# Patient Record
Sex: FEMALE | Race: ASIAN | NOT HISPANIC OR LATINO
[De-identification: names, ages, dates, MRNs, and addresses within clinical notes are randomized per-mention and may not be internally consistent; named-entity substitution may affect disease eponyms.]

---

## 2017-04-03 PROBLEM — Z00.00 ENCOUNTER FOR PREVENTIVE HEALTH EXAMINATION: Status: ACTIVE | Noted: 2017-04-03

## 2020-08-17 ENCOUNTER — RESULT REVIEW (OUTPATIENT)
Age: 40
End: 2020-08-17

## 2021-11-29 ENCOUNTER — NON-APPOINTMENT (OUTPATIENT)
Age: 41
End: 2021-11-29

## 2021-12-16 ENCOUNTER — NON-APPOINTMENT (OUTPATIENT)
Age: 41
End: 2021-12-16

## 2021-12-16 ENCOUNTER — APPOINTMENT (OUTPATIENT)
Dept: GASTROENTEROLOGY | Facility: CLINIC | Age: 41
End: 2021-12-16
Payer: COMMERCIAL

## 2021-12-16 VITALS
HEIGHT: 69 IN | OXYGEN SATURATION: 97 % | DIASTOLIC BLOOD PRESSURE: 100 MMHG | RESPIRATION RATE: 14 BRPM | TEMPERATURE: 96.3 F | WEIGHT: 160 LBS | BODY MASS INDEX: 23.7 KG/M2 | SYSTOLIC BLOOD PRESSURE: 145 MMHG | HEART RATE: 62 BPM

## 2021-12-16 VITALS
SYSTOLIC BLOOD PRESSURE: 109 MMHG | TEMPERATURE: 97.3 F | HEART RATE: 66 BPM | RESPIRATION RATE: 15 BRPM | HEIGHT: 61 IN | OXYGEN SATURATION: 98 % | WEIGHT: 113 LBS | BODY MASS INDEX: 21.34 KG/M2 | DIASTOLIC BLOOD PRESSURE: 62 MMHG

## 2021-12-16 DIAGNOSIS — R79.82 ELEVATED C-REACTIVE PROTEIN (CRP): ICD-10-CM

## 2021-12-16 PROCEDURE — 99205 OFFICE O/P NEW HI 60 MIN: CPT

## 2021-12-16 PROCEDURE — 99072 ADDL SUPL MATRL&STAF TM PHE: CPT

## 2021-12-16 NOTE — PHYSICAL EXAM
[General Appearance - Alert] : alert [General Appearance - In No Acute Distress] : in no acute distress [Sclera] : the sclera and conjunctiva were normal [PERRL With Normal Accommodation] : pupils were equal in size, round, and reactive to light [Extraocular Movements] : extraocular movements were intact [Outer Ear] : the ears and nose were normal in appearance [Oropharynx] : the oropharynx was normal [Neck Appearance] : the appearance of the neck was normal [Neck Cervical Mass (___cm)] : no neck mass was observed [Jugular Venous Distention Increased] : there was no jugular-venous distention [Thyroid Diffuse Enlargement] : the thyroid was not enlarged [Thyroid Nodule] : there were no palpable thyroid nodules [Auscultation Breath Sounds / Voice Sounds] : lungs were clear to auscultation bilaterally [Full Pulse] : the pedal pulses are present [Edema] : there was no peripheral edema [Bowel Sounds] : normal bowel sounds [Abdomen Soft] : soft [Abdomen Tenderness] : non-tender [Abdomen Mass (___ Cm)] : no abdominal mass palpated [Cervical Lymph Nodes Enlarged Posterior Bilaterally] : posterior cervical [Cervical Lymph Nodes Enlarged Anterior Bilaterally] : anterior cervical [Supraclavicular Lymph Nodes Enlarged Bilaterally] : supraclavicular [Axillary Lymph Nodes Enlarged Bilaterally] : axillary [Femoral Lymph Nodes Enlarged Bilaterally] : femoral [Inguinal Lymph Nodes Enlarged Bilaterally] : inguinal [No CVA Tenderness] : no ~M costovertebral angle tenderness [No Spinal Tenderness] : no spinal tenderness [Skin Color & Pigmentation] : normal skin color and pigmentation [Skin Turgor] : normal skin turgor [] : no rash [Deep Tendon Reflexes (DTR)] : deep tendon reflexes were 2+ and symmetric [Sensation] : the sensory exam was normal to light touch and pinprick [No Focal Deficits] : no focal deficits [Oriented To Time, Place, And Person] : oriented to person, place, and time [Impaired Insight] : insight and judgment were intact [Affect] : the affect was normal

## 2021-12-17 ENCOUNTER — TRANSCRIPTION ENCOUNTER (OUTPATIENT)
Age: 41
End: 2021-12-17

## 2021-12-20 NOTE — REVIEW OF SYSTEMS
[Negative] : Heme/Lymph [Constipation] : constipation [As Noted in HPI] : as noted in HPI [Abdominal Pain] : no abdominal pain [Vomiting] : no vomiting [Diarrhea] : no diarrhea [Heartburn] : no heartburn [Melena] : no melena [FreeTextEntry7] : bloating

## 2021-12-20 NOTE — HISTORY OF PRESENT ILLNESS
[de-identified] : HPI - 41 F PMHx migraines, Hx SIBO, traveler's diarrhea, HTN presenting to GI clinic to establish care as well as with acute complaints. \par \par Patient reporting several year history of abdominal bloating (most bothersome symptom), in the setting of chronic constipation. For her constipation, she has tried multiple pharmacologic therapies including Amitiza, Linzess, Trulance, Miralax, providing minimal relief or rio diarrhea. Currently on Calm supplement (Magnesium), noting this has helped her BMs, noting soft-loose stools. Without supplementation, she reports her stools to be BS 3, otherwise on it, she notes BS 5-6. As far as her bloating, this dates back to 2014, when she then had experienced a lot of stress at work. Leading up to this in 2012 was the death of her mother and in 2011, she had a case of traveler's diarrhea in [place], which was treated for with abxs. Since the bloating has began, she underwent a colonoscopy in 2014, with findings c/w IBS and also ruled out for H pylori (stool antigen test). In 2017, was diagnosed with SIBO (unclear which time at the time), given Xifaxan, probiotics, and started on a LOW-FODMAP diet, which provided minimal change in her bloating symptoms. In 2018, was given an additional round of Xifaxan for persistent symptoms however developed an allergic reaction/rash to the medication and stopped after taking it for 7 days. Most recent SIBO test in 2021, negative, borderline for Hydrogen and Methane. Starting this year, she has tried multiple restrictive diets including paleo, keto, low-Fodmap, low histamine diet, noting no difference in symptoms. \par \par ROS otherwise negative for fevers, chills, nausea/vomiting, early satiety. Remainder of ROS negative. \par \par [place] stool score - Without supplemental medications, notes her BMs to be around BS 3, otherwise on supplements, will be in the 5-6 range. \par \par #1 Symptom - Bloating\par #2 Symptom - Constipation\par #3 Symptom - N/A\par \par EIMs:\par Referred by - Caro Delgado\par \par PMHx - IBS-C, History of traveler's diarrhea\par PSHx - Left hip surgery\par Rx - Enalapril, Lipitor, Zyrtec\par Supplements/herbs/OTC - Calm supplement\par A/C or NSAIDs - None\par FHx - Multiple myeloma, denies any history of CRC, stomach, esophageal, pancreatic or liver cancer\par Allergies - Seasonal allergies, Xifaxan allergy -rash\par EtOH -  Socially\par Smoking -  Smoked cigarettes in college\par Drugs - Denies\par Other toxins (mold, occupational exposure) - None\par Sleep habits/circadian rhythm - sleeps late\par \par Diet + food intolerances - No specific noted food intolerances, currently on a restrictive diet consisting mainly of LOW FODMAP. Previously tried Paleo, keto, low-FODMAP, low histamine diet with minimal success\par \par Labs/Stool tests - GI MAP test Oct 2021\par Breath tests - Multiple prior SIBO breath tests, normal tests, borderline near positive for both methane and hydrogen \par Imaging - None\par EGD - No prior EGD\par Colonoscopy - 2014 (GI: Dr Lucia Martinez) non-bleeding internal hemorrhoids, looping of the colon, significant colonic spasm c/w IBS. Normal Ileum. \par \par Birth mode - Vaginal delivery\par Breastfeeding - \par Exposure to antibiotics, NSAIDs, PPI - Last use of Abxs many years ago\par GI Infections - Traveler's diarrhea in [place] (2011)\par Eating Disorder - Denies\par Traumatic events - Personal stressors (work, mother's death in 2012)\par

## 2021-12-20 NOTE — ASSESSMENT
[FreeTextEntry1] : 41 F PMHx migraines, Hx SIBO, traveler's diarrhea, HTN presenting to GI clinic to establish care as well as with acute complaints. \par \par #Abdominal Bloating\par #Chronic Constipation\par Patient presenting with persistent symptoms of bloating in the setting of chronic constipation, unclear etiology for chronic constipation \par - Ordered for CBC, CMP, TSH, Free T3/T4, Thyroglobulin, TPO ab, HbA1c, Fecal calprotectin, ESR/CRP, folate, B12, Vitamin D, Fe studies, Celiac HLA DQ 2/8, tissue transglutaminase IgA/IgG, quantitative IgA, Giardia stool, Homocysteine \par - No prior abdominal imaging, will order for MR Abd/Pelvis w/w/o contrast\par - Will plan for Smart Pill study to assess GI motility\par - No role for EGD or Colonoscopy at this time\par \par #Elevated CRP \par With elevated CRP on outside GI stool testing panel\par - will repeat CRP today\par \par RTC in 8 weeks to re-assess symptoms.\par \par Agnieszka Magaña, \par Gastroenterology Fellow\par

## 2021-12-28 LAB
25(OH)D3 SERPL-MCNC: 23.2 NG/ML
ALBUMIN SERPL ELPH-MCNC: 4.8 G/DL
ALP BLD-CCNC: 61 U/L
ALT SERPL-CCNC: 12 U/L
ANION GAP SERPL CALC-SCNC: 11 MMOL/L
AST SERPL-CCNC: 14 U/L
BASOPHILS # BLD AUTO: 0.02 K/UL
BASOPHILS NFR BLD AUTO: 0.5 %
BILIRUB SERPL-MCNC: 0.3 MG/DL
BUN SERPL-MCNC: 11 MG/DL
CALCIUM SERPL-MCNC: 10.1 MG/DL
CHLORIDE SERPL-SCNC: 104 MMOL/L
CO2 SERPL-SCNC: 27 MMOL/L
CREAT SERPL-MCNC: 0.8 MG/DL
CRP SERPL-MCNC: <3 MG/L
EOSINOPHIL # BLD AUTO: 0.04 K/UL
EOSINOPHIL NFR BLD AUTO: 0.9 %
ERYTHROCYTE [SEDIMENTATION RATE] IN BLOOD BY WESTERGREN METHOD: 26 MM/HR
ESTIMATED AVERAGE GLUCOSE: 105 MG/DL
FERRITIN SERPL-MCNC: 75 NG/ML
FOLATE SERPL-MCNC: 14 NG/ML
GLUCOSE SERPL-MCNC: 67 MG/DL
HBA1C MFR BLD HPLC: 5.3 %
HCT VFR BLD CALC: 46.3 %
HCYS SERPL-MCNC: 9.1 UMOL/L
HGB BLD-MCNC: 14.6 G/DL
IGA SER QL IEP: 317 MG/DL
IMM GRANULOCYTES NFR BLD AUTO: 0 %
IRON SATN MFR SERPL: 24 %
IRON SERPL-MCNC: 67 UG/DL
LYMPHOCYTES # BLD AUTO: 1.67 K/UL
LYMPHOCYTES NFR BLD AUTO: 39 %
MAN DIFF?: NORMAL
MCHC RBC-ENTMCNC: 30 PG
MCHC RBC-ENTMCNC: 31.5 GM/DL
MCV RBC AUTO: 95.3 FL
MONOCYTES # BLD AUTO: 0.33 K/UL
MONOCYTES NFR BLD AUTO: 7.7 %
NEUTROPHILS # BLD AUTO: 2.22 K/UL
NEUTROPHILS NFR BLD AUTO: 51.9 %
PLATELET # BLD AUTO: 234 K/UL
POTASSIUM SERPL-SCNC: 4.5 MMOL/L
PROT SERPL-MCNC: 7.6 G/DL
RBC # BLD: 4.86 M/UL
RBC # FLD: 12.1 %
SODIUM SERPL-SCNC: 142 MMOL/L
T3FREE SERPL-MCNC: 3.07 PG/ML
T4 FREE SERPL-MCNC: 1.4 NG/DL
THYROGLOB AB SERPL-ACNC: <20 IU/ML
THYROGLOB SERPL-MCNC: 8.02 NG/ML
THYROPEROXIDASE AB SERPL IA-ACNC: 10.7 IU/ML
TIBC SERPL-MCNC: 280 UG/DL
TSH SERPL-ACNC: 1.11 UIU/ML
TTG IGA SER IA-ACNC: <1.2 U/ML
TTG IGA SER-ACNC: NEGATIVE
TTG IGG SER IA-ACNC: 2.1 U/ML
TTG IGG SER IA-ACNC: NEGATIVE
UIBC SERPL-MCNC: 213 UG/DL
VIT B12 SERPL-MCNC: 705 PG/ML
WBC # FLD AUTO: 4.28 K/UL

## 2021-12-29 ENCOUNTER — NON-APPOINTMENT (OUTPATIENT)
Age: 41
End: 2021-12-29

## 2021-12-31 LAB
ANNOTATION COMMENT IMP: NORMAL
HLA-DQ2: POSITIVE
HLA-DQ8 QL: NEGATIVE
REF LAB TEST METHOD: NORMAL

## 2022-01-12 ENCOUNTER — NON-APPOINTMENT (OUTPATIENT)
Age: 42
End: 2022-01-12

## 2022-02-03 PROBLEM — Z00.00 ENCOUNTER FOR PREVENTIVE HEALTH EXAMINATION: Noted: 2022-02-03

## 2022-02-07 LAB — G LAMBLIA AG STL QL: NORMAL

## 2022-02-09 ENCOUNTER — NON-APPOINTMENT (OUTPATIENT)
Age: 42
End: 2022-02-09

## 2022-02-10 LAB — CALPROTECTIN FECAL: <16 UG/G

## 2022-02-11 ENCOUNTER — NON-APPOINTMENT (OUTPATIENT)
Age: 42
End: 2022-02-11

## 2022-03-31 ENCOUNTER — APPOINTMENT (OUTPATIENT)
Dept: GASTROENTEROLOGY | Facility: CLINIC | Age: 42
End: 2022-03-31
Payer: COMMERCIAL

## 2022-03-31 VITALS
DIASTOLIC BLOOD PRESSURE: 75 MMHG | TEMPERATURE: 97.2 F | HEART RATE: 75 BPM | HEIGHT: 61 IN | SYSTOLIC BLOOD PRESSURE: 110 MMHG | RESPIRATION RATE: 14 BRPM | BODY MASS INDEX: 21.52 KG/M2 | WEIGHT: 114 LBS | OXYGEN SATURATION: 96 %

## 2022-03-31 DIAGNOSIS — K59.09 OTHER CONSTIPATION: ICD-10-CM

## 2022-03-31 DIAGNOSIS — R14.0 ABDOMINAL DISTENSION (GASEOUS): ICD-10-CM

## 2022-03-31 DIAGNOSIS — F41.9 ANXIETY DISORDER, UNSPECIFIED: ICD-10-CM

## 2022-03-31 PROCEDURE — 99215 OFFICE O/P EST HI 40 MIN: CPT

## 2022-04-01 NOTE — PHYSICAL EXAM
[General Appearance - Alert] : alert [General Appearance - In No Acute Distress] : in no acute distress [Sclera] : the sclera and conjunctiva were normal [PERRL With Normal Accommodation] : pupils were equal in size, round, and reactive to light [Extraocular Movements] : extraocular movements were intact [Outer Ear] : the ears and nose were normal in appearance [Oropharynx] : the oropharynx was normal [Neck Appearance] : the appearance of the neck was normal [Neck Cervical Mass (___cm)] : no neck mass was observed [Jugular Venous Distention Increased] : there was no jugular-venous distention [Thyroid Diffuse Enlargement] : the thyroid was not enlarged [Thyroid Nodule] : there were no palpable thyroid nodules [Exaggerated Use Of Accessory Muscles For Inspiration] : no accessory muscle use [Full Pulse] : the pedal pulses are present [Edema] : there was no peripheral edema [Bowel Sounds] : normal bowel sounds [Abdomen Soft] : soft [Abdomen Tenderness] : non-tender [Abdomen Mass (___ Cm)] : no abdominal mass palpated [Cervical Lymph Nodes Enlarged Posterior Bilaterally] : posterior cervical [Cervical Lymph Nodes Enlarged Anterior Bilaterally] : anterior cervical [Axillary Lymph Nodes Enlarged Bilaterally] : axillary [Supraclavicular Lymph Nodes Enlarged Bilaterally] : supraclavicular [Femoral Lymph Nodes Enlarged Bilaterally] : femoral [Inguinal Lymph Nodes Enlarged Bilaterally] : inguinal [No CVA Tenderness] : no ~M costovertebral angle tenderness [No Spinal Tenderness] : no spinal tenderness [Abnormal Walk] : normal gait [Nail Clubbing] : no clubbing  or cyanosis of the fingernails [Musculoskeletal - Swelling] : no joint swelling seen [Motor Tone] : muscle strength and tone were normal [Skin Color & Pigmentation] : normal skin color and pigmentation [Skin Turgor] : normal skin turgor [] : no rash [Deep Tendon Reflexes (DTR)] : deep tendon reflexes were 2+ and symmetric [Sensation] : the sensory exam was normal to light touch and pinprick [No Focal Deficits] : no focal deficits [Oriented To Time, Place, And Person] : oriented to person, place, and time [Impaired Insight] : insight and judgment were intact [Affect] : the affect was normal [FreeTextEntry1] : distended but soft abdomen

## 2022-04-01 NOTE — REVIEW OF SYSTEMS
[Constipation] : constipation [As Noted in HPI] : as noted in HPI [Negative] : Heme/Lymph [Abdominal Pain] : no abdominal pain [Vomiting] : no vomiting [Diarrhea] : no diarrhea [Heartburn] : no heartburn [Melena] : no melena [FreeTextEntry7] : +bloating

## 2022-04-01 NOTE — HISTORY OF PRESENT ILLNESS
[de-identified] : 41 F PMHx migraines, Hx SIBO, traveler's diarrhea, HTN presenting to GI clinic to establish care as well as with acute complaints. \par \par 3/31/22: \par -Patient notes that since her last visit, she continues to experience persistent abdominal bloating. \par -Last tested for SIBO 5/22/21, results of which were borderline (H2, 19 @ 90 minutes, CH4 9)\par -Notes that the last time she was treated with Xifaxin, she experienced an intolerable rash and was unable to complete the full course. Also reports that with the Xifaxan, even the first round of tx, she experienced 0% improvement\par -Currently still takes 1 teaspoon of Magnesium CALM daily, reporting BMs every day or every other day, BS 4 or softer\par -Never set up for Smart Pill study\par -MR Abdomen (3/4/22) - large volume of stool noted in colon. No small bowel inflammation.\par -Vitamin D insufficiency noted on last visit, 23.2,  Caro Delgado has since placed her on Vitamin D supplementation\par -Awaiting authorization for Motegrity\par -ROS otherwise negative for nausea/vomiting, abdominal pain\par \par 12/16/21:\par Patient reporting several year history of abdominal bloating (most bothersome symptom), in the setting of chronic constipation. For her constipation, she has tried multiple pharmacologic therapies including Amitiza, Linzess, Trulance, Miralax, providing minimal relief or rio diarrhea. Currently on Calm supplement (Magnesium), noting this has helped her BMs, noting soft-loose stools. Without supplementation, she reports her stools to be BS 3, otherwise on it, she notes BS 5-6. As far as her bloating, this dates back to 2014, when she then had experienced a lot of stress at work. Leading up to this in 2012 was the death of her mother and in 2011, she had a case of traveler's diarrhea in [place], which was treated for with abxs. Since the bloating has began, she underwent a colonoscopy in 2014, with findings c/w IBS and also ruled out for H pylori (stool antigen test). In 2017, was diagnosed with SIBO (unclear which time at the time), given Xifaxan, probiotics, and started on a LOW-FODMAP diet, which provided minimal change in her bloating symptoms. In 2018, was given an additional round of Xifaxan for persistent symptoms however developed an allergic reaction/rash to the medication and stopped after taking it for 7 days. Most recent SIBO test in 2021, negative, borderline for Hydrogen and Methane. Starting this year, she has tried multiple restrictive diets including paleo, keto, low-Fodmap, low histamine diet, noting no difference in symptoms. \par \par ROS otherwise negative for fevers, chills, nausea/vomiting, early satiety. Remainder of ROS negative. \par \par [place] stool score - Without supplemental medications, notes her BMs to be around BS 3, otherwise on supplements, will be in the 5-6 range. \par \par #1 Symptom - Bloating\par #2 Symptom - Constipation\par #3 Symptom - N/A\par \par EIMs:\par Referred by - Caro Delgado\par \par PMHx - IBS-C, History of traveler's diarrhea\par PSHx - Left hip surgery\par Rx - Enalapril, Lipitor, Zyrtec\par Supplements/herbs/OTC - Calm supplement\par A/C or NSAIDs - None\par FHx - Multiple myeloma, denies any history of CRC, stomach, esophageal, pancreatic or liver cancer\par Allergies - Seasonal allergies, Xifaxan allergy -rash\par EtOH - Socially\par Smoking - Smoked cigarettes in college\par Drugs - Denies\par Other toxins (mold, occupational exposure) - None\par Sleep habits/circadian rhythm - sleeps late\par \par Diet + food intolerances - No specific noted food intolerances, currently on a restrictive diet consisting mainly of LOW FODMAP. Previously tried Paleo, keto, low-FODMAP, low histamine diet with minimal success\par \par Labs/Stool tests - GI MAP test Oct 2021\par Breath tests - Multiple prior SIBO breath tests, normal tests, borderline near positive for both methane and hydrogen \par Imaging - None\par EGD - No prior EGD\par Colonoscopy - 2014 (GI: Dr Lucia Martinez) non-bleeding internal hemorrhoids, looping of the colon, significant colonic spasm c/w IBS. Normal Ileum. \par \par Birth mode - Vaginal delivery\par Breastfeeding - \par Exposure to antibiotics, NSAIDs, PPI - Last use of Abxs many years ago\par GI Infections - Traveler's diarrhea in  [place] (2011)\par Eating Disorder - Denies\par Traumatic events - Personal stressors (work, mother's death in 2012)\par

## 2022-04-01 NOTE — ASSESSMENT
[FreeTextEntry1] : 41 F PMHx migraines, Hx SIBO, traveler's diarrhea, HTN presenting to GI clinic to follow up on abdominal bloating. \par \par #Abdominal Bloating\par #Chronic Constipation\par Patient presenting with persistent symptoms of bloating in the setting of chronic constipation, unclear etiology for chronic constipation \par - Last SIBO breath test (2021) - borderline results, not confirmatory for SIBO\par - Previously non-responsive to IBGard\par - Previous BW only notable for elevated ESR but normal CRP, non-specific finding\par - MR Abdomen (3/4/22) - large volume of stool noted in colon. No small bowel inflammation.\par - Will plan for Smart Pill study to assess GI motility\par - Colonoscopy - 2014 (GI: Dr Lucia Martinez) non-bleeding internal hemorrhoids, looping of the colon, significant colonic spasm c/w IBS. Normal Ileum. 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 \par -Will plan for EGD with plan for disaccharidase testing \par -Will discuss nutrition re: initiation of probiotics\par -Patient notes long-standing history of anxiety, Rx for Sertraline 25 mg daily and referral for psychiatry \par -Encouraged that patient start with Sertraline first, and if not fully responsive to sertraline, can initiate Motegrity @ microdose of 0.5 mg daily with plan to uptitrate every 3-4 days as tolerated\par -Collateral regarding previous sucrose testing \par \par #Elevated CRP \par With elevated CRP on outside GI stool testing panel\par - Repeat CRP normal\par \par RTC in 8 weeks to re-assess symptoms.\par \par Agnieszka Magaña, \par Gastroenterology Fellow\par 
[FreeTextEntry1] : 41 F PMHx migraines, Hx SIBO, traveler's diarrhea, HTN presenting to GI clinic to follow up on abdominal bloating. \par \par #Abdominal Bloating\par #Chronic Constipation\par Patient presenting with persistent symptoms of bloating in the setting of chronic constipation, unclear etiology for chronic constipation \par - Last SIBO breath test (2021) - borderline results, not confirmatory for SIBO\par - Previously non-responsive to IBGard\par - Previous BW only notable for elevated ESR but normal CRP, non-specific finding\par - MR Abdomen (3/4/22) - large volume of stool noted in colon. No small bowel inflammation.\par - Will plan for Smart Pill study to assess GI motility\par - Colonoscopy - 2014 (GI: Dr Lucia Martinez) non-bleeding internal hemorrhoids, looping of the colon, significant colonic spasm c/w IBS. Normal Ileum. 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 \par -Will plan for EGD with plan for disaccharidase testing \par -Will discuss nutrition re: initiation of probiotics\par -Patient notes long-standing history of anxiety, Rx for Sertraline 25 mg daily and referral for psychiatry \par -Encouraged that patient start with Sertraline first, and if not fully responsive to sertraline, can initiate Motegrity @ microdose of 0.5 mg daily with plan to uptitrate every 3-4 days as tolerated\par -Collateral regarding previous sucrose testing \par \par #Elevated CRP \par With elevated CRP on outside GI stool testing panel\par - Repeat CRP normal\par \par RTC in 8 weeks to re-assess symptoms.\par \par Agnieszka Magaña, \par Gastroenterology Fellow\par 
98.5

## 2022-04-01 NOTE — HISTORY OF PRESENT ILLNESS
[de-identified] : 41 F PMHx migraines, Hx SIBO, traveler's diarrhea, HTN presenting to GI clinic to establish care as well as with acute complaints. \par \par 3/31/22: \par -Patient notes that since her last visit, she continues to experience persistent abdominal bloating. \par -Last tested for SIBO 5/22/21, results of which were borderline (H2, 19 @ 90 minutes, CH4 9)\par -Notes that the last time she was treated with Xifaxin, she experienced an intolerable rash and was unable to complete the full course. Also reports that with the Xifaxan, even the first round of tx, she experienced 0% improvement\par -Currently still takes 1 teaspoon of Magnesium CALM daily, reporting BMs every day or every other day, BS 4 or softer\par -Never set up for Smart Pill study\par -MR Abdomen (3/4/22) - large volume of stool noted in colon. No small bowel inflammation.\par -Vitamin D insufficiency noted on last visit, 23.2,  Caro Delgado has since placed her on Vitamin D supplementation\par -Awaiting authorization for Motegrity\par -ROS otherwise negative for nausea/vomiting, abdominal pain\par \par 12/16/21:\par Patient reporting several year history of abdominal bloating (most bothersome symptom), in the setting of chronic constipation. For her constipation, she has tried multiple pharmacologic therapies including Amitiza, Linzess, Trulance, Miralax, providing minimal relief or rio diarrhea. Currently on Calm supplement (Magnesium), noting this has helped her BMs, noting soft-loose stools. Without supplementation, she reports her stools to be BS 3, otherwise on it, she notes BS 5-6. As far as her bloating, this dates back to 2014, when she then had experienced a lot of stress at work. Leading up to this in 2012 was the death of her mother and in 2011, she had a case of traveler's diarrhea in [place], which was treated for with abxs. Since the bloating has began, she underwent a colonoscopy in 2014, with findings c/w IBS and also ruled out for H pylori (stool antigen test). In 2017, was diagnosed with SIBO (unclear which time at the time), given Xifaxan, probiotics, and started on a LOW-FODMAP diet, which provided minimal change in her bloating symptoms. In 2018, was given an additional round of Xifaxan for persistent symptoms however developed an allergic reaction/rash to the medication and stopped after taking it for 7 days. Most recent SIBO test in 2021, negative, borderline for Hydrogen and Methane. Starting this year, she has tried multiple restrictive diets including paleo, keto, low-Fodmap, low histamine diet, noting no difference in symptoms. \par \par ROS otherwise negative for fevers, chills, nausea/vomiting, early satiety. Remainder of ROS negative. \par \par [place] stool score - Without supplemental medications, notes her BMs to be around BS 3, otherwise on supplements, will be in the 5-6 range. \par \par #1 Symptom - Bloating\par #2 Symptom - Constipation\par #3 Symptom - N/A\par \par EIMs:\par Referred by - Caro Delgado\par \par PMHx - IBS-C, History of traveler's diarrhea\par PSHx - Left hip surgery\par Rx - Enalapril, Lipitor, Zyrtec\par Supplements/herbs/OTC - Calm supplement\par A/C or NSAIDs - None\par FHx - Multiple myeloma, denies any history of CRC, stomach, esophageal, pancreatic or liver cancer\par Allergies - Seasonal allergies, Xifaxan allergy -rash\par EtOH - Socially\par Smoking - Smoked cigarettes in college\par Drugs - Denies\par Other toxins (mold, occupational exposure) - None\par Sleep habits/circadian rhythm - sleeps late\par \par Diet + food intolerances - No specific noted food intolerances, currently on a restrictive diet consisting mainly of LOW FODMAP. Previously tried Paleo, keto, low-FODMAP, low histamine diet with minimal success\par \par Labs/Stool tests - GI MAP test Oct 2021\par Breath tests - Multiple prior SIBO breath tests, normal tests, borderline near positive for both methane and hydrogen \par Imaging - None\par EGD - No prior EGD\par Colonoscopy - 2014 (GI: Dr Lucia Martinez) non-bleeding internal hemorrhoids, looping of the colon, significant colonic spasm c/w IBS. Normal Ileum. \par \par Birth mode - Vaginal delivery\par Breastfeeding - \par Exposure to antibiotics, NSAIDs, PPI - Last use of Abxs many years ago\par GI Infections - Traveler's diarrhea in  [place] (2011)\par Eating Disorder - Denies\par Traumatic events - Personal stressors (work, mother's death in 2012)\par

## 2022-04-11 ENCOUNTER — APPOINTMENT (OUTPATIENT)
Dept: GASTROENTEROLOGY | Facility: CLINIC | Age: 42
End: 2022-04-11

## 2022-06-09 ENCOUNTER — APPOINTMENT (OUTPATIENT)
Dept: GASTROENTEROLOGY | Facility: CLINIC | Age: 42
End: 2022-06-09
Payer: COMMERCIAL

## 2022-06-09 VITALS
WEIGHT: 115 LBS | SYSTOLIC BLOOD PRESSURE: 125 MMHG | TEMPERATURE: 97.8 F | OXYGEN SATURATION: 98 % | HEIGHT: 61 IN | HEART RATE: 89 BPM | RESPIRATION RATE: 15 BRPM | DIASTOLIC BLOOD PRESSURE: 83 MMHG | BODY MASS INDEX: 21.71 KG/M2

## 2022-06-09 PROCEDURE — 99214 OFFICE O/P EST MOD 30 MIN: CPT | Mod: 25

## 2022-06-09 PROCEDURE — 96372 THER/PROPH/DIAG INJ SC/IM: CPT

## 2022-06-09 RX ORDER — ATORVASTATIN CALCIUM 10 MG/1
10 TABLET, FILM COATED ORAL
Qty: 90 | Refills: 0 | Status: ACTIVE | COMMUNITY
Start: 2022-02-28

## 2022-06-09 RX ORDER — ENALAPRIL MALEATE 20 MG/1
20 TABLET ORAL
Qty: 90 | Refills: 0 | Status: ACTIVE | COMMUNITY
Start: 2022-02-28

## 2022-06-09 RX ORDER — CYANOCOBALAMIN 1000 UG/ML
1000 INJECTION INTRAMUSCULAR; SUBCUTANEOUS
Qty: 0 | Refills: 0 | Status: COMPLETED | OUTPATIENT
Start: 2022-06-09

## 2022-06-09 RX ADMIN — CYANOCOBALAMIN 0 MCG/ML: 1000 INJECTION, SOLUTION INTRAMUSCULAR at 00:00

## 2022-06-09 NOTE — HISTORY OF PRESENT ILLNESS
[de-identified] : 41 F PMHx migraines, Hx SIBO, traveler's diarrhea, HTN presenting to GI clinic to establish care as well as with acute complaints. \par 6/9/22\par - pt here was unable to take next steps due to insurance barriers\par - sertraline 25mg made no difference with any symptoms \par \par 3/31/22: \par -Patient notes that since her last visit, she continues to experience persistent abdominal bloating. \par -Last tested for SIBO 5/22/21, results of which were borderline (H2, 19 @ 90 minutes, CH4 9)\par -Notes that the last time she was treated with Xifaxin, she experienced an intolerable rash and was unable to complete the full course. Also reports that with the Xifaxan, even the first round of tx, she experienced 0% improvement\par -Currently still takes 1 teaspoon of Magnesium CALM daily, reporting BMs every day or every other day, BS 4 or softer\par -Never set up for Smart Pill study\par -MR Abdomen (3/4/22) - large volume of stool noted in colon. No small bowel inflammation.\par -Vitamin D insufficiency noted on last visit, 23.2,  Caro Delgado has since placed her on Vitamin D supplementation\par -Awaiting authorization for Motegrity\par -ROS otherwise negative for nausea/vomiting, abdominal pain\par \par 12/16/21:\par Patient reporting several year history of abdominal bloating (most bothersome symptom), in the setting of chronic constipation. For her constipation, she has tried multiple pharmacologic therapies including Amitiza, Linzess, Trulance, Miralax, providing minimal relief or rio diarrhea. Currently on Calm supplement (Magnesium), noting this has helped her BMs, noting soft-loose stools. Without supplementation, she reports her stools to be BS 3, otherwise on it, she notes BS 5-6. As far as her bloating, this dates back to 2014, when she then had experienced a lot of stress at work. Leading up to this in 2012 was the death of her mother and in 2011, she had a case of traveler's diarrhea in [place], which was treated for with abxs. Since the bloating has began, she underwent a colonoscopy in 2014, with findings c/w IBS and also ruled out for H pylori (stool antigen test). In 2017, was diagnosed with SIBO (unclear which time at the time), given Xifaxan, probiotics, and started on a LOW-FODMAP diet, which provided minimal change in her bloating symptoms. In 2018, was given an additional round of Xifaxan for persistent symptoms however developed an allergic reaction/rash to the medication and stopped after taking it for 7 days. Most recent SIBO test in 2021, negative, borderline for Hydrogen and Methane. Starting this year, she has tried multiple restrictive diets including paleo, keto, low-Fodmap, low histamine diet, noting no difference in symptoms. \par \par ROS otherwise negative for fevers, chills, nausea/vomiting, early satiety. Remainder of ROS negative. \par \par Wheeling stool score - Without supplemental medications, notes her BMs to be around BS 3, otherwise on supplements, will be in the 5-6 range. \par \par #1 Symptom - Bloating\par #2 Symptom - Constipation\par #3 Symptom - N/A\par \par EIMs:\par Referred by - Caro Danny\par \par PMHx - IBS-C, History of traveler's diarrhea\par PSHx - Left hip surgery\par Rx - Enalapril, Lipitor, Zyrtec\par Supplements/herbs/OTC - Calm supplement\par A/C or NSAIDs - None\par FHx - Multiple myeloma, denies any history of CRC, stomach, esophageal, pancreatic or liver cancer\par Allergies - Seasonal allergies, Xifaxan allergy -rash\par EtOH - Socially\par Smoking - Smoked cigarettes in college\par Drugs - Denies\par Other toxins (mold, occupational exposure) - None\par Sleep habits/circadian rhythm - sleeps late\par \par Diet + food intolerances - No specific noted food intolerances, currently on a restrictive diet consisting mainly of LOW FODMAP. Previously tried Paleo, keto, low-FODMAP, low histamine diet with minimal success\par \par Labs/Stool tests - GI MAP test Oct 2021\par Breath tests - Multiple prior SIBO breath tests, normal tests, borderline near positive for both methane and hydrogen \par Imaging - None\par EGD - No prior EGD\par Colonoscopy - 2014 (GI: Dr Lucia Martinez) non-bleeding internal hemorrhoids, looping of the colon, significant colonic spasm c/w IBS. Normal Ileum. \par \par Birth mode - Vaginal delivery\par Breastfeeding - \par Exposure to antibiotics, NSAIDs, PPI - Last use of Abxs many years ago\par GI Infections - Traveler's diarrhea in  [place] (2011)\par Eating Disorder - Denies\par Traumatic events - Personal stressors (work, mother's death in 2012)\par

## 2022-06-09 NOTE — REVIEW OF SYSTEMS
[Negative] : Heme/Lymph [Abdominal Pain] : no abdominal pain [Vomiting] : no vomiting [Constipation] : constipation [Diarrhea] : no diarrhea [Heartburn] : no heartburn [Melena] : no melena [As Noted in HPI] : as noted in HPI [FreeTextEntry7] : +bloating

## 2022-06-09 NOTE — PHYSICAL EXAM
[General Appearance - Alert] : alert [General Appearance - In No Acute Distress] : in no acute distress [Sclera] : the sclera and conjunctiva were normal [PERRL With Normal Accommodation] : pupils were equal in size, round, and reactive to light [Extraocular Movements] : extraocular movements were intact [Outer Ear] : the ears and nose were normal in appearance [Oropharynx] : the oropharynx was normal [Neck Appearance] : the appearance of the neck was normal [Neck Cervical Mass (___cm)] : no neck mass was observed [Jugular Venous Distention Increased] : there was no jugular-venous distention [Thyroid Diffuse Enlargement] : the thyroid was not enlarged [Thyroid Nodule] : there were no palpable thyroid nodules [Exaggerated Use Of Accessory Muscles For Inspiration] : no accessory muscle use [Full Pulse] : the pedal pulses are present [Edema] : there was no peripheral edema [Bowel Sounds] : normal bowel sounds [Abdomen Soft] : soft [Abdomen Tenderness] : non-tender [Abdomen Mass (___ Cm)] : no abdominal mass palpated [FreeTextEntry1] : distended but soft abdomen [Cervical Lymph Nodes Enlarged Posterior Bilaterally] : posterior cervical [Cervical Lymph Nodes Enlarged Anterior Bilaterally] : anterior cervical [Supraclavicular Lymph Nodes Enlarged Bilaterally] : supraclavicular [Axillary Lymph Nodes Enlarged Bilaterally] : axillary [Femoral Lymph Nodes Enlarged Bilaterally] : femoral [Inguinal Lymph Nodes Enlarged Bilaterally] : inguinal [No CVA Tenderness] : no ~M costovertebral angle tenderness [No Spinal Tenderness] : no spinal tenderness [Abnormal Walk] : normal gait [Nail Clubbing] : no clubbing  or cyanosis of the fingernails [Musculoskeletal - Swelling] : no joint swelling seen [Motor Tone] : muscle strength and tone were normal [Skin Color & Pigmentation] : normal skin color and pigmentation [Skin Turgor] : normal skin turgor [] : no rash [Deep Tendon Reflexes (DTR)] : deep tendon reflexes were 2+ and symmetric [Sensation] : the sensory exam was normal to light touch and pinprick [No Focal Deficits] : no focal deficits [Oriented To Time, Place, And Person] : oriented to person, place, and time [Impaired Insight] : insight and judgment were intact [Affect] : the affect was normal

## 2022-06-09 NOTE — ASSESSMENT
[FreeTextEntry1] : 41 F PMHx migraines, Hx SIBO, traveler's diarrhea, HTN presenting to GI clinic to follow up on abdominal bloating likely due to constipation  unknown if  slow transit vs outlet obstruction and disorder of the gut brain axis \par - elevated ESR but normal CRP, non-specific finding\par - MR Abdomen (3/4/22) - large volume of stool noted in colon. No small bowel inflammation.\par - Will hold Smart Pill as unable to get it approved\par - Colonoscopy - 2014 (GI: Dr Lucia Martinez) non-bleeding internal hemorrhoids, looping of the colon, significant colonic spasm c/w IBS. Normal Ileum. 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

## 2022-10-05 ENCOUNTER — RESULT REVIEW (OUTPATIENT)
Age: 42
End: 2022-10-05

## 2022-10-09 ENCOUNTER — RX RENEWAL (OUTPATIENT)
Age: 42
End: 2022-10-09

## 2022-10-09 ENCOUNTER — TRANSCRIPTION ENCOUNTER (OUTPATIENT)
Age: 42
End: 2022-10-09

## 2023-02-05 ENCOUNTER — TRANSCRIPTION ENCOUNTER (OUTPATIENT)
Age: 43
End: 2023-02-05

## 2023-02-16 ENCOUNTER — TRANSCRIPTION ENCOUNTER (OUTPATIENT)
Age: 43
End: 2023-02-16

## 2023-03-06 ENCOUNTER — NON-APPOINTMENT (OUTPATIENT)
Age: 43
End: 2023-03-06

## 2023-03-13 ENCOUNTER — APPOINTMENT (OUTPATIENT)
Age: 43
End: 2023-03-13
Payer: COMMERCIAL

## 2023-03-13 ENCOUNTER — RESULT REVIEW (OUTPATIENT)
Age: 43
End: 2023-03-13

## 2023-03-13 PROCEDURE — 43235 EGD DIAGNOSTIC BRUSH WASH: CPT

## 2023-03-29 ENCOUNTER — APPOINTMENT (OUTPATIENT)
Dept: GASTROENTEROLOGY | Facility: CLINIC | Age: 43
End: 2023-03-29
Payer: COMMERCIAL

## 2023-03-29 DIAGNOSIS — K59.04 CHRONIC IDIOPATHIC CONSTIPATION: ICD-10-CM

## 2023-03-29 PROCEDURE — 99443: CPT

## 2023-05-10 ENCOUNTER — APPOINTMENT (OUTPATIENT)
Dept: GASTROENTEROLOGY | Facility: CLINIC | Age: 43
End: 2023-05-10
Payer: COMMERCIAL

## 2023-05-10 DIAGNOSIS — B49 UNSPECIFIED MYCOSIS: ICD-10-CM

## 2023-05-10 PROCEDURE — 99214 OFFICE O/P EST MOD 30 MIN: CPT | Mod: 95

## 2023-05-10 RX ORDER — NYSTATIN 500000 [USP'U]/1
500000 TABLET, FILM COATED ORAL
Qty: 56 | Refills: 3 | Status: ACTIVE | COMMUNITY
Start: 2023-05-10 | End: 1900-01-01

## 2023-05-10 NOTE — REASON FOR VISIT
[Home] : at home, [unfilled] , at the time of the visit. [Medical Office: ([facility])___] : at the medical office located in  [Patient] : the patient [Self] : self [Follow-up] : a follow-up of an existing diagnosis

## 2023-05-10 NOTE — HISTORY OF PRESENT ILLNESS
[de-identified] : 41 F PMHx migraines, Hx SIBO, traveler's diarrhea, HTN presenting to GI clinic to establish care as well as with acute complaints. \par 5/10/23\par - increased sertraline 37.5mg  not seeing results\par - texture of bm softer, almost diarrhea\par - also on calm \par - restarted digestive enzymes\par - doing physical therapy, not seeing results yet, its been 6-7wks\par \par 6/9/22\par - pt here was unable to take next steps due to insurance barriers\par - sertraline 25mg made no difference with any symptoms \par \par 3/31/22: \par -Patient notes that since her last visit, she continues to experience persistent abdominal bloating. \par -Last tested for SIBO 5/22/21, results of which were borderline (H2, 19 @ 90 minutes, CH4 9)\par -Notes that the last time she was treated with Xifaxin, she experienced an intolerable rash and was unable to complete the full course. Also reports that with the Xifaxan, even the first round of tx, she experienced 0% improvement\par -Currently still takes 1 teaspoon of Magnesium CALM daily, reporting BMs every day or every other day, BS 4 or softer\par -Never set up for Smart Pill study\par -MR Abdomen (3/4/22) - large volume of stool noted in colon. No small bowel inflammation.\par -Vitamin D insufficiency noted on last visit, 23.2,  Caro Delgado has since placed her on Vitamin D supplementation\par -Awaiting authorization for M[employer]gr[employer]\par -ROS otherwise negative for nausea/vomiting, abdominal pain\par \par 12/16/21:\par Patient reporting several year history of abdominal bloating (most bothersome symptom), in the setting of chronic constipation. For her constipation, she has tried multiple pharmacologic therapies including Amitiza, Linzess, Trulance, Miralax, providing minimal relief or rio diarrhea. Currently on Calm supplement (Magnesium), noting this has helped her BMs, noting soft-loose stools. Without supplementation, she reports her stools to be BS 3, otherwise on it, she notes BS 5-6. As far as her bloating, this dates back to 2014, when she then had experienced a lot of stress at work. Leading up to this in 2012 was the death of her mother and in 2011, she had a case of traveler's diarrhea in [place], which was treated for with abxs. Since the bloating has began, she underwent a colonoscopy in 2014, with findings c/w IBS and also ruled out for H pylori (stool antigen test). In 2017, was diagnosed with SIBO (unclear which time at the time), given Xifaxan, probiotics, and started on a LOW-FODMAP diet, which provided minimal change in her bloating symptoms. In 2018, was given an additional round of Xifaxan for persistent symptoms however developed an allergic reaction/rash to the medication and stopped after taking it for 7 days. Most recent SIBO test in 2021, negative, borderline for Hydrogen and Methane. Starting this year, she has tried multiple restrictive diets including paleo, keto, low-Fodmap, low histamine diet, noting no difference in symptoms. \par \par ROS otherwise negative for fevers, chills, nausea/vomiting, early satiety. Remainder of ROS negative. \par \par Deposit stool score - Without supplemental medications, notes her BMs to be around BS 3, otherwise on supplements, will be in the 5-6 range. \par \par #1 Symptom - Bloating\par #2 Symptom - Constipation\par #3 Symptom - N/A\par \par EIMs:\par Referred by - Caro Delgado\par \par PMHx - IBS-C, History of traveler's diarrhea\par PSHx - Left hip surgery\par Rx - Enalapril, Lipitor, Zyrtec\par Supplements/herbs/OTC - Calm supplement\par A/C or NSAIDs - None\par FHx - Multiple myeloma, denies any history of CRC, stomach, esophageal, pancreatic or liver cancer\par Allergies - Seasonal allergies, Xifaxan allergy -rash\par EtOH - Socially\par Smoking - Smoked cigarettes in college\par Drugs - Denies\par Other toxins (mold, occupational exposure) - None\par Sleep habits/circadian rhythm - sleeps late\par \par Diet + food intolerances - No specific noted food intolerances, currently on a restrictive diet consisting mainly of LOW FODMAP. Previously tried Paleo, keto, low-FODMAP, low histamine diet with minimal success\par \par Labs/Stool tests - GI MAP test Oct 2021\par Breath tests - Multiple prior SIBO breath tests, normal tests, borderline near positive for both methane and hydrogen \par Imaging - None\par EGD - No prior EGD\par Colonoscopy - 2014 (GI: Dr Lucia Martinez) non-bleeding internal hemorrhoids, looping of the colon, significant colonic spasm c/w IBS. Normal Ileum. \par \par Birth mode - Vaginal delivery\par Breastfeeding - \par Exposure to antibiotics, NSAIDs, PPI - Last use of Abxs many years ago\par GI Infections - Traveler's diarrhea in [place] (2011)\par Eating Disorder - Denies\par Traumatic events - Personal stressors (work, mother's death in 2012)\par

## 2023-05-10 NOTE — ASSESSMENT
[FreeTextEntry1] : 41 F PMHx migraines, Hx SIBO, traveler's diarrhea, HTN presenting to GI clinic to follow up on abdominal bloating likely due to constipation in setting of [employer] DIARRHEA, ANTIBIOTIC EXPOSURE  unknown if  slow transit vs outlet obstruction and disorder of the gut brain axis \par - nystatin trial\par - afterward vsl trial\par - elevated ESR but normal CRP, non-specific finding\par - MR Abdomen (3/4/22) - large volume of stool noted in colon. No small bowel inflammation.\par - Will hold Smart Pill as unable to get it approved\par - Colonoscopy - 2014 (GI: Dr Lucai Martinez) non-bleeding internal hemorrhoids, looping of the colon, significant colonic spasm c/w IBS. Normal Ileum. 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

## 2023-05-11 ENCOUNTER — TRANSCRIPTION ENCOUNTER (OUTPATIENT)
Age: 43
End: 2023-05-11

## 2023-05-11 ENCOUNTER — NON-APPOINTMENT (OUTPATIENT)
Age: 43
End: 2023-05-11

## 2023-06-12 ENCOUNTER — TRANSCRIPTION ENCOUNTER (OUTPATIENT)
Age: 43
End: 2023-06-12

## 2023-06-30 ENCOUNTER — TRANSCRIPTION ENCOUNTER (OUTPATIENT)
Age: 43
End: 2023-06-30

## 2023-10-17 ENCOUNTER — TRANSCRIPTION ENCOUNTER (OUTPATIENT)
Age: 43
End: 2023-10-17

## 2023-10-17 RX ORDER — PRUCALOPRIDE 2 MG/1
2 TABLET, FILM COATED ORAL
Qty: 60 | Refills: 3 | Status: ACTIVE | COMMUNITY
Start: 2022-03-23 | End: 1900-01-01

## 2023-10-18 ENCOUNTER — RX RENEWAL (OUTPATIENT)
Age: 43
End: 2023-10-18

## 2023-10-18 RX ORDER — SERTRALINE 25 MG/1
25 TABLET, FILM COATED ORAL
Qty: 30 | Refills: 11 | Status: ACTIVE | COMMUNITY
Start: 2022-03-31 | End: 1900-01-01

## 2025-06-26 ENCOUNTER — NON-APPOINTMENT (OUTPATIENT)
Age: 45
End: 2025-06-26

## 2025-06-27 ENCOUNTER — APPOINTMENT (OUTPATIENT)
Dept: CARDIOLOGY | Facility: CLINIC | Age: 45
End: 2025-06-27

## 2025-06-27 VITALS
BODY MASS INDEX: 25.32 KG/M2 | WEIGHT: 134 LBS | DIASTOLIC BLOOD PRESSURE: 88 MMHG | SYSTOLIC BLOOD PRESSURE: 134 MMHG | RESPIRATION RATE: 18 BRPM | OXYGEN SATURATION: 97 % | HEART RATE: 80 BPM

## 2025-06-27 PROBLEM — R06.00 DYSPNEA: Status: ACTIVE | Noted: 2025-06-27

## 2025-07-25 ENCOUNTER — APPOINTMENT (OUTPATIENT)
Dept: CARDIOLOGY | Facility: CLINIC | Age: 45
End: 2025-07-25

## 2025-07-25 VITALS — SYSTOLIC BLOOD PRESSURE: 133 MMHG | DIASTOLIC BLOOD PRESSURE: 78 MMHG

## 2025-07-25 DIAGNOSIS — E78.5 HYPERLIPIDEMIA, UNSPECIFIED: ICD-10-CM

## 2025-07-25 DIAGNOSIS — I10 ESSENTIAL (PRIMARY) HYPERTENSION: ICD-10-CM

## 2025-07-25 DIAGNOSIS — R07.9 CHEST PAIN, UNSPECIFIED: ICD-10-CM

## 2025-07-25 PROCEDURE — 93306 TTE W/DOPPLER COMPLETE: CPT

## 2025-07-25 PROCEDURE — 93015 CV STRESS TEST SUPVJ I&R: CPT

## 2025-08-29 ENCOUNTER — APPOINTMENT (OUTPATIENT)
Dept: CARDIOLOGY | Facility: CLINIC | Age: 45
End: 2025-08-29